# Patient Record
Sex: MALE | Race: OTHER | Employment: STUDENT | URBAN - NONMETROPOLITAN AREA
[De-identification: names, ages, dates, MRNs, and addresses within clinical notes are randomized per-mention and may not be internally consistent; named-entity substitution may affect disease eponyms.]

---
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
## 2025-07-06 NOTE — TELEPHONE ENCOUNTER
Patrick Jauregui called requesting a refill of the below medication which has been pended for you:     Requested Prescriptions     Pending Prescriptions Disp Refills    methylphenidate (CONCERTA) 36 MG extended release tablet 60 tablet 0     Sig: Take 2 tablets by mouth every morning for 30 days.        Last Appointment Date: 1/9/2020  Next Appointment Date: 7/10/2020    No Known Allergies   Per [facility] last refill 04/01/2020 #60 PROVIDER:[TOKEN:[[id number]]]